# Patient Record
Sex: MALE | Race: WHITE | NOT HISPANIC OR LATINO | URBAN - METROPOLITAN AREA
[De-identification: names, ages, dates, MRNs, and addresses within clinical notes are randomized per-mention and may not be internally consistent; named-entity substitution may affect disease eponyms.]

---

## 2024-04-09 ENCOUNTER — EMERGENCY (EMERGENCY)
Facility: HOSPITAL | Age: 24
LOS: 1 days | Discharge: HOME | End: 2024-04-09
Attending: EMERGENCY MEDICINE | Admitting: EMERGENCY MEDICINE
Payer: COMMERCIAL

## 2024-04-09 VITALS
HEART RATE: 80 BPM | OXYGEN SATURATION: 98 % | SYSTOLIC BLOOD PRESSURE: 140 MMHG | DIASTOLIC BLOOD PRESSURE: 78 MMHG | TEMPERATURE: 98 F | RESPIRATION RATE: 16 BRPM
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
## 2024-04-09 DIAGNOSIS — S01.512A LACERATION WITHOUT FOREIGN BODY OF ORAL CAVITY, INITIAL ENCOUNTER: ICD-10-CM

## 2024-04-09 PROCEDURE — 99284 EMERGENCY DEPT VISIT MOD MDM: CPT

## 2024-04-09 RX ADMIN — Medication 1 TABLET(S): at 20:16

## 2024-04-09 NOTE — ED PROVIDER NOTE - NPI NUMBER (FOR SYSADMIN USE ONLY) :
Alert and oriented to person, place, time/situation. normal mood and affect. no apparent risk to self or others.
[[id number]]

## 2024-04-09 NOTE — ED PROVIDER NOTE - CLINICAL SUMMARY MEDICAL DECISION MAKING FREE TEXT BOX
Deep tongue laceration after playing basketball and was elbowed.    Dr Meyers plastic reconstructive surgeon seeing patient Deep tongue laceration after playing basketball and was elbowed.    Dr Meyers plastic reconstructive surgeon seeing patient  Augmentin x 7 days

## 2024-04-09 NOTE — ED ADULT TRIAGE NOTE - CHIEF COMPLAINT QUOTE
Pt bite tongue while playing basketball today. Pt states still bleeding and hurts to speak so writing everything in triage. Breathing is even and unlabored. airway does not appear to be compromised.

## 2024-04-09 NOTE — ED ADULT NURSE NOTE - NSFALLUNIVINTERV_ED_ALL_ED
Assistance OOB with selected safe patient handling equipment if applicable/Bed/Stretcher in lowest position, wheels locked, appropriate side rails in place/Call bell, personal items and telephone in reach/Instruct patient to call for assistance before getting out of bed/chair/stretcher/Non-slip footwear applied when patient is off stretcher/[employer] to call system/Physically safe environment - no spills, clutter or unnecessary equipment/Purposeful proactive rounding/Room/bathroom lighting operational, light cord in reach

## 2024-04-09 NOTE — ED ADULT NURSE NOTE - CAS ELECT INFOMATION PROVIDED
no loss of consciousness, no gait abnormality, no headache, no sensory deficits, and no weakness. DC instructions

## 2024-04-09 NOTE — ED PROVIDER NOTE - NSFOLLOWUPINSTRUCTIONS_ED_ALL_ED_FT
Please follow up with Dr Meyers in the office. Please follow up with Dr Meyers in the office.  Take the antibiotic amoxicillin/clavulanate 1 tablet every 12 hours for a week.     Return at any time if you have any concerns.

## 2024-04-09 NOTE — ED PROVIDER NOTE - CARE PROVIDER_API CALL
Biju Meyers  Plastic Surgery  [address] [place] [id number]  Phone: [phone]  Fax: [phone]  Follow Up Time:

## 2025-03-09 NOTE — ED PROVIDER NOTE - PATIENT PORTAL LINK FT
You can access the FollowMyHealth Patient Portal offered by [facility] by registering at the following website: [email or web address]. By joining [employer]’s FollowMyHealth portal, you will also be able to view your health information using other applications (apps) compatible with our system.
Yes